# Patient Record
Sex: FEMALE | Race: BLACK OR AFRICAN AMERICAN | NOT HISPANIC OR LATINO | Employment: OTHER | URBAN - METROPOLITAN AREA
[De-identification: names, ages, dates, MRNs, and addresses within clinical notes are randomized per-mention and may not be internally consistent; named-entity substitution may affect disease eponyms.]

---

## 2017-11-08 ENCOUNTER — OFFICE VISIT (OUTPATIENT)
Dept: PRIMARY CARE CLINIC | Facility: CLINIC | Age: 37
End: 2017-11-08
Payer: MEDICAID

## 2017-11-08 ENCOUNTER — CLINICAL SUPPORT (OUTPATIENT)
Dept: PRIMARY CARE CLINIC | Facility: CLINIC | Age: 37
End: 2017-11-08
Payer: MEDICAID

## 2017-11-08 VITALS
RESPIRATION RATE: 20 BRPM | BODY MASS INDEX: 23.46 KG/M2 | WEIGHT: 140.81 LBS | DIASTOLIC BLOOD PRESSURE: 90 MMHG | HEART RATE: 96 BPM | OXYGEN SATURATION: 96 % | SYSTOLIC BLOOD PRESSURE: 131 MMHG | TEMPERATURE: 98 F | HEIGHT: 65 IN

## 2017-11-08 DIAGNOSIS — F45.8 TEETH GRINDING: ICD-10-CM

## 2017-11-08 DIAGNOSIS — E87.6 HYPOKALEMIA: ICD-10-CM

## 2017-11-08 DIAGNOSIS — F79 MENTAL RETARDATION: Primary | ICD-10-CM

## 2017-11-08 DIAGNOSIS — F20.9 SCHIZOPHRENIA, UNSPECIFIED TYPE: ICD-10-CM

## 2017-11-08 LAB
ALBUMIN SERPL BCP-MCNC: 3.7 G/DL
ALP SERPL-CCNC: 121 U/L
ALT SERPL W/O P-5'-P-CCNC: 18 U/L
ANION GAP SERPL CALC-SCNC: 9 MMOL/L
AST SERPL-CCNC: 23 U/L
BASOPHILS # BLD AUTO: 0.04 K/UL
BASOPHILS NFR BLD: 0.8 %
BILIRUB SERPL-MCNC: 0.2 MG/DL
BUN SERPL-MCNC: 14 MG/DL
CALCIUM SERPL-MCNC: 9.3 MG/DL
CARBAMAZEPINE SERPL-MCNC: 8.8 UG/ML
CHLORIDE SERPL-SCNC: 102 MMOL/L
CO2 SERPL-SCNC: 26 MMOL/L
CREAT SERPL-MCNC: 0.9 MG/DL
DIFFERENTIAL METHOD: NORMAL
EOSINOPHIL # BLD AUTO: 0 K/UL
EOSINOPHIL NFR BLD: 0 %
ERYTHROCYTE [DISTWIDTH] IN BLOOD BY AUTOMATED COUNT: 13.3 %
EST. GFR  (AFRICAN AMERICAN): >60 ML/MIN/1.73 M^2
EST. GFR  (NON AFRICAN AMERICAN): >60 ML/MIN/1.73 M^2
GLUCOSE SERPL-MCNC: 80 MG/DL
HCT VFR BLD AUTO: 38.3 %
HGB BLD-MCNC: 12.4 G/DL
IMM GRANULOCYTES # BLD AUTO: 0.02 K/UL
IMM GRANULOCYTES NFR BLD AUTO: 0.4 %
LYMPHOCYTES # BLD AUTO: 1.7 K/UL
LYMPHOCYTES NFR BLD: 33.6 %
MAGNESIUM SERPL-MCNC: 2 MG/DL
MCH RBC QN AUTO: 29.5 PG
MCHC RBC AUTO-ENTMCNC: 32.4 G/DL
MCV RBC AUTO: 91 FL
MONOCYTES # BLD AUTO: 0.6 K/UL
MONOCYTES NFR BLD: 12.8 %
NEUTROPHILS # BLD AUTO: 2.6 K/UL
NEUTROPHILS NFR BLD: 52.4 %
NRBC BLD-RTO: 0 /100 WBC
PLATELET # BLD AUTO: 317 K/UL
PMV BLD AUTO: 10.4 FL
POTASSIUM SERPL-SCNC: 3.7 MMOL/L
PROT SERPL-MCNC: 8.7 G/DL
RBC # BLD AUTO: 4.2 M/UL
SODIUM SERPL-SCNC: 137 MMOL/L
WBC # BLD AUTO: 4.91 K/UL

## 2017-11-08 PROCEDURE — 80156 ASSAY CARBAMAZEPINE TOTAL: CPT

## 2017-11-08 PROCEDURE — 99999 PR PBB SHADOW E&M-EST. PATIENT-LVL II: CPT | Mod: PBBFAC,,,

## 2017-11-08 PROCEDURE — 99212 OFFICE O/P EST SF 10 MIN: CPT | Mod: PBBFAC,27,PN

## 2017-11-08 PROCEDURE — 83735 ASSAY OF MAGNESIUM: CPT

## 2017-11-08 PROCEDURE — 85025 COMPLETE CBC W/AUTO DIFF WBC: CPT

## 2017-11-08 PROCEDURE — 99213 OFFICE O/P EST LOW 20 MIN: CPT | Mod: S$PBB,,, | Performed by: FAMILY MEDICINE

## 2017-11-08 PROCEDURE — 80053 COMPREHEN METABOLIC PANEL: CPT

## 2017-11-08 PROCEDURE — 99213 OFFICE O/P EST LOW 20 MIN: CPT | Mod: PBBFAC,PN | Performed by: FAMILY MEDICINE

## 2017-11-08 PROCEDURE — 99999 PR PBB SHADOW E&M-EST. PATIENT-LVL III: CPT | Mod: PBBFAC,,, | Performed by: FAMILY MEDICINE

## 2017-11-08 RX ORDER — CLONIDINE HYDROCHLORIDE 0.2 MG/1
1 TABLET ORAL 2 TIMES DAILY
Refills: 3 | COMMUNITY
Start: 2017-10-28

## 2017-11-08 RX ORDER — QUETIAPINE FUMARATE 200 MG/1
1 TABLET, FILM COATED ORAL
Refills: 3 | COMMUNITY
Start: 2017-10-31

## 2017-11-08 RX ORDER — HYDROXYZINE PAMOATE 50 MG/1
1 CAPSULE ORAL
Refills: 3 | COMMUNITY
Start: 2017-10-31

## 2017-11-08 RX ORDER — CARBAMAZEPINE 200 MG/1
2 TABLET ORAL 2 TIMES DAILY
Refills: 3 | COMMUNITY
Start: 2017-10-21

## 2017-11-08 NOTE — PROGRESS NOTES
Subjective:       Patient ID: Arabella Rick is a 37 y.o. female.    Chief Complaint: Annual Exam (90L paperwork)    HPI: 37-year-old black female in for 90 L    ROS:  Skin: no psoriasis, eczema, skin cancer  HEENT: No headache, ocular pain, blurred vision, diplopia, epistaxis, hoarseness change in voice, thyroid trouble  Lung: No pneumonia, asthma, Tb, wheezing, SOB, no smoking  Heart: No chest pain, ankle edema, palpitations, MI, christopher murmur, hypertension, hyperlipidemia  Abdomen: No nausea, vomiting, diarrhea, constipation, ulcers, hepatitis, gallbladder disease, melena, hematochezia, hematemesis  : no UTI, renal disease, stones  GYN last menstrual period 3 weeks ago  MS: no fractures, O/A, lupus, rheumatoid, gout  Neuro: No dizziness, LOC, seizures + MR ,  No diabetes, no anemia, no anxiety, no depression history schizophrenia    Objective:   Physical Exam:  General: Well nourished, well developed, no acute distress some bizarre movements  Skin: No lesions  HEENT: Eyes PERRLA, EOM intact, nose patent, throat non-erythematous , slightly grinding teeth   NECK: Supple, no bruits, No JVD, no nodes  Lungs: Clear, no rales, rhonchi, wheezing  Heart: Regular rate and rhythm, no murmurs, gallops, or rubs  Abdomen: flat, bowel sounds positive, no tenderness, or organomegaly  MS: Range of motion and muscle strength intact  Neuro: Alert, CN intact, oriented X 3 decreased mentation  Extremities: No cyanosis, clubbing, or edema         Assessment:       1. Mental retardation    2. Schizophrenia, unspecified type    3. Hypokalemia    4. Teeth grinding        Plan:       Mental retardation    Schizophrenia, unspecified type  -     CBC auto differential; Future; Expected date: 11/08/2017  -     Comprehensive metabolic panel; Future; Expected date: 11/08/2017  -     MAGNESIUM; Future; Expected date: 11/22/2017  -     Carbamazepine level, total; Future; Expected date: 11/22/2017    Hypokalemia    Teeth grinding

## 2017-11-08 NOTE — PATIENT INSTRUCTIONS
History of ER visit with hypokalemia--do CBC CMP Tegretol level and magnesium  90L filled out  Refill medications

## 2017-11-17 ENCOUNTER — TELEPHONE (OUTPATIENT)
Dept: PRIMARY CARE CLINIC | Facility: CLINIC | Age: 37
End: 2017-11-17

## 2017-11-17 NOTE — TELEPHONE ENCOUNTER
----- Message from Zach Welsh MD sent at 11/11/2017  5:33 PM CST -----  Call tell lab WNL no new tx

## 2019-02-01 ENCOUNTER — OFFICE VISIT (OUTPATIENT)
Dept: PRIMARY CARE CLINIC | Facility: CLINIC | Age: 39
End: 2019-02-01
Payer: MEDICAID

## 2019-02-01 VITALS
OXYGEN SATURATION: 100 % | DIASTOLIC BLOOD PRESSURE: 88 MMHG | WEIGHT: 147 LBS | SYSTOLIC BLOOD PRESSURE: 124 MMHG | HEIGHT: 65 IN | BODY MASS INDEX: 24.49 KG/M2 | RESPIRATION RATE: 18 BRPM | HEART RATE: 101 BPM

## 2019-02-01 DIAGNOSIS — F45.8 TEETH GRINDING: ICD-10-CM

## 2019-02-01 DIAGNOSIS — F79 MENTAL RETARDATION: ICD-10-CM

## 2019-02-01 DIAGNOSIS — F20.9 SCHIZOPHRENIA, UNSPECIFIED TYPE: Primary | ICD-10-CM

## 2019-02-01 DIAGNOSIS — E87.6 HYPOKALEMIA: ICD-10-CM

## 2019-02-01 PROCEDURE — 99214 OFFICE O/P EST MOD 30 MIN: CPT | Mod: S$PBB,,, | Performed by: FAMILY MEDICINE

## 2019-02-01 PROCEDURE — 99213 OFFICE O/P EST LOW 20 MIN: CPT | Mod: PBBFAC,PN | Performed by: FAMILY MEDICINE

## 2019-02-01 PROCEDURE — 99999 PR PBB SHADOW E&M-EST. PATIENT-LVL III: ICD-10-PCS | Mod: PBBFAC,,, | Performed by: FAMILY MEDICINE

## 2019-02-01 PROCEDURE — 99214 PR OFFICE/OUTPT VISIT, EST, LEVL IV, 30-39 MIN: ICD-10-PCS | Mod: S$PBB,,, | Performed by: FAMILY MEDICINE

## 2019-02-01 PROCEDURE — 99999 PR PBB SHADOW E&M-EST. PATIENT-LVL III: CPT | Mod: PBBFAC,,, | Performed by: FAMILY MEDICINE

## 2019-02-01 NOTE — PROGRESS NOTES
Subjective:       Patient ID: Arabella Rick is a 38 y.o. female.    Chief Complaint: Other (90L form )    HPI: 37-year-old black female--eating well, +BM, ambulating well    ROS:  Skin: no psoriasis, eczema, skin cancer--neck lesions have resolved  HEENT: No headache, ocular pain, blurred vision, diplopia, epistaxis, hoarseness change in voice, thyroid trouble +teeth grinding  Lung: No pneumonia, asthma, Tb, wheezing, SOB, no smoking  Heart: No chest pain, ankle edema, palpitations, MI, christopher murmur, hypertension, hyperlipidemia  Abdomen: No nausea, vomiting, diarrhea, constipation, ulcers, hepatitis, gallbladder disease, melena, hematochezia, hematemesis  : no UTI, renal disease, stones  GYN last menstrual period 01/20/2019  MS: no fractures, O/A, lupus, rheumatoid, gout  Neuro: No dizziness, LOC, seizures + MR , +schizophrenia  No diabetes, no anemia, no anxiety, no depression history schizophrenia    Objective:   Physical Exam:  General: Well nourished, well developed, no acute distress some bizarre movements  Skin: No lesions  HEENT: Eyes PERRLA, EOM intact, nose patent, throat non-erythematous , slightly grinding teeth   NECK: Supple, no bruits, No JVD, no nodes  Lungs: Clear, no rales, rhonchi, wheezing  Heart: Regular rate and rhythm, no murmurs, gallops, or rubs  Abdomen: flat, bowel sounds positive, no tenderness, or organomegaly  MS: Range of motion and muscle strength intact  Neuro: Alert, CN intact, oriented X 3 decreased mentation  Extremities: No cyanosis, clubbing, or edema         Assessment:       1. Schizophrenia, unspecified type    2. Mental retardation    3. Teeth grinding    4. Hypokalemia        Plan:       Schizophrenia, unspecified type  -     CBC auto differential; Future; Expected date: 02/01/2019  -     Comprehensive metabolic panel; Future; Expected date: 02/01/2019  -     EKG 12-lead; Future  -     Fecal Immunochemical Test (iFOBT); Future; Expected date: 02/01/2019  -     Lipid  panel; Future; Expected date: 02/01/2019  -     X-Ray Chest PA And Lateral; Future; Expected date: 02/01/2019  -     POCT urine dipstick without microscope  -     T4, free; Future; Expected date: 02/01/2019  -     TSH; Future; Expected date: 02/01/2019    Mental retardation    Teeth grinding    Hypokalemia  -     T4, free; Future; Expected date: 02/01/2019  -     TSH; Future; Expected date: 02/01/2019

## 2019-10-17 DIAGNOSIS — Z12.39 BREAST CANCER SCREENING: ICD-10-CM

## 2019-12-04 ENCOUNTER — OFFICE VISIT (OUTPATIENT)
Dept: PRIMARY CARE CLINIC | Facility: CLINIC | Age: 39
End: 2019-12-04
Payer: MEDICAID

## 2019-12-04 VITALS
HEART RATE: 76 BPM | SYSTOLIC BLOOD PRESSURE: 122 MMHG | WEIGHT: 147 LBS | DIASTOLIC BLOOD PRESSURE: 84 MMHG | OXYGEN SATURATION: 97 % | BODY MASS INDEX: 24.49 KG/M2 | RESPIRATION RATE: 18 BRPM | HEIGHT: 65 IN

## 2019-12-04 DIAGNOSIS — F20.9 SCHIZOPHRENIA, UNSPECIFIED TYPE: ICD-10-CM

## 2019-12-04 DIAGNOSIS — F79 INTELLECTUAL DISABILITY: Primary | ICD-10-CM

## 2019-12-04 DIAGNOSIS — R56.9 SEIZURE: ICD-10-CM

## 2019-12-04 DIAGNOSIS — F45.8 TEETH GRINDING: ICD-10-CM

## 2019-12-04 DIAGNOSIS — E87.6 HYPOKALEMIA: ICD-10-CM

## 2019-12-04 PROCEDURE — 99999 PR PBB SHADOW E&M-EST. PATIENT-LVL III: CPT | Mod: PBBFAC,,, | Performed by: FAMILY MEDICINE

## 2019-12-04 PROCEDURE — 99214 PR OFFICE/OUTPT VISIT, EST, LEVL IV, 30-39 MIN: ICD-10-PCS | Mod: S$PBB,,, | Performed by: FAMILY MEDICINE

## 2019-12-04 PROCEDURE — 99214 OFFICE O/P EST MOD 30 MIN: CPT | Mod: S$PBB,,, | Performed by: FAMILY MEDICINE

## 2019-12-04 PROCEDURE — 99213 OFFICE O/P EST LOW 20 MIN: CPT | Mod: PBBFAC,PN | Performed by: FAMILY MEDICINE

## 2019-12-04 PROCEDURE — 99999 PR PBB SHADOW E&M-EST. PATIENT-LVL III: ICD-10-PCS | Mod: PBBFAC,,, | Performed by: FAMILY MEDICINE

## 2019-12-04 RX ORDER — TRAZODONE HYDROCHLORIDE 50 MG/1
50 TABLET ORAL NIGHTLY
Qty: 30 TABLET | Refills: 11 | Status: SHIPPED | OUTPATIENT
Start: 2019-12-04 | End: 2022-08-15

## 2019-12-04 NOTE — PROGRESS NOTES
Subjective:       Patient ID: Arabella Rick is a 39 y.o. female.    Chief Complaint: 90 L form    HPI: 37-year-old black female--patient lives with her mom--has 24 hr sitters.  In for 90 L form to be filled out--patient with mental retardation/schizophrenia/teeth grinding/hypokalemia/seizures.  Nonverbal    ROS:  Skin: no psoriasis, eczema, skin cancer--neck lesions --better--picks  arms and head  HEENT: No headache, ocular pain, blurred vision, diplopia, epistaxis, hoarseness change in voice, thyroid trouble +teeth grinding  Lung: No pneumonia, asthma, Tb, wheezing, SOB, no smoking  Heart: No chest pain, ankle edema, palpitations, MI, christopher murmur, hypertension, hyperlipidemia  Abdomen: No nausea, vomiting, diarrhea, constipation, ulcers, hepatitis, gallbladder disease, melena, hematochezia, hematemesis  : no UTI, renal disease, stones  GYN last menstrual period 2 weeks ago  MS: no fractures, O/A, lupus, rheumatoid, gout  Neuro: No dizziness, LOC,+ seizures--none times years + MR , +schizophrenia  No diabetes, no anemia, no anxiety, no depression history schizophrenia   Single, no children, lives with mother and sitters around clock, Disabled     Objective:   Physical Exam:  General: Well nourished, well developed, no acute distress some bizarre movements  Skin: No lesions  HEENT: Eyes PERRLA, EOM intact, nose patent, throat non-erythematous , slightly grinding teeth   NECK: Supple, no bruits, No JVD, no nodes  Lungs: Clear, no rales, rhonchi, wheezing  Heart: Regular rate and rhythm, no murmurs, gallops, or rubs  Abdomen: flat, bowel sounds positive, no tenderness, or organomegaly  MS: Range of motion and muscle strength intact  Neuro: Alert, CN intact, oriented X 3 decreased mentation  Extremities: No cyanosis, clubbing, or edema         Assessment:       1. Mental retardation    2. Schizophrenia, unspecified type    3. Teeth grinding    4. Hypokalemia    5. Seizure        Plan:       Mental  retardation    Schizophrenia, unspecified type  -     CBC auto differential; Future; Expected date: 12/04/2019  -     Comprehensive metabolic panel; Future; Expected date: 12/04/2019  -     Fecal Immunochemical Test (iFOBT); Future; Expected date: 12/04/2019  -     Lipid panel; Future; Expected date: 12/04/2019  -     T4, free; Future; Expected date: 12/04/2019  -     TSH; Future; Expected date: 12/04/2019  -     Fecal Immunochemical Test (iFOBT); Future; Expected date: 12/04/2019    Teeth grinding    Hypokalemia    Seizure  -     Carbamazepine level, total; Future; Expected date: 12/04/2019    Other orders  -     traZODone (DESYREL) 50 MG tablet; Take 1 tablet (50 mg total) by mouth every evening.  Dispense: 30 tablet; Refill: 11      90 L form filled out mental retardation/schizophrenia/teeth grinding/hypokalemia/seizures/nonverbal --see scanned in report  Patient with chronic grinding of teeth  Patient occasionally combative and hostile usually cooperative list in some some sleep issues  Patient needs routine lab CBCs CMP lipids T4 TSH and tegretol level-now-in 6 months needs CBCs CMP tegretol  Health maintenance lipids stool for blood tetanus flu shot mammogram Pap smear--doubt patient will be cooperative enough to do mammogram or Pap smear  Patient does take a nap with morning medications told could try and decrease the duration of the nap --patient could try does rule 50 mg q.h.s. p.r.n. to help with sleep

## 2020-10-14 ENCOUNTER — TELEPHONE (OUTPATIENT)
Dept: PRIMARY CARE CLINIC | Facility: CLINIC | Age: 40
End: 2020-10-14

## 2020-10-14 NOTE — TELEPHONE ENCOUNTER
----- Message from Jackelyn Gould sent at 10/14/2020  3:55 PM CDT -----  Contact: Pt [phone]  Requesting a call about a lab order she needs.    Please call and advise.    Thank You

## 2020-10-14 NOTE — TELEPHONE ENCOUNTER
----- Message from Jackelyn Gould sent at 10/14/2020  3:55 PM CDT -----  Contact: Pt [phone]  Requesting a call about a lab order she needs.    Please call and advise.    Thank You

## 2020-10-21 ENCOUNTER — TELEPHONE (OUTPATIENT)
Dept: PRIMARY CARE CLINIC | Facility: CLINIC | Age: 40
End: 2020-10-21

## 2020-10-21 NOTE — TELEPHONE ENCOUNTER
----- Message from Gwen Paris sent at 10/21/2020  3:40 PM CDT -----  Contact: Laney/Caregiver [phone]  Appt for tomorrow was cancelled by mistake. Please call to reschedule. Also needs to know if fasting Is necessary.    Please call and advise.    Thank You

## 2020-11-13 ENCOUNTER — OFFICE VISIT (OUTPATIENT)
Dept: PRIMARY CARE CLINIC | Facility: CLINIC | Age: 40
End: 2020-11-13
Payer: MEDICAID

## 2020-11-13 VITALS
HEART RATE: 106 BPM | OXYGEN SATURATION: 99 % | BODY MASS INDEX: 24.28 KG/M2 | DIASTOLIC BLOOD PRESSURE: 82 MMHG | SYSTOLIC BLOOD PRESSURE: 138 MMHG | RESPIRATION RATE: 18 BRPM | TEMPERATURE: 98 F | HEIGHT: 65 IN | WEIGHT: 145.75 LBS

## 2020-11-13 DIAGNOSIS — E87.6 HYPOKALEMIA: ICD-10-CM

## 2020-11-13 DIAGNOSIS — F20.9 SCHIZOPHRENIA, UNSPECIFIED TYPE: Primary | ICD-10-CM

## 2020-11-13 DIAGNOSIS — F45.8 TEETH GRINDING: ICD-10-CM

## 2020-11-13 DIAGNOSIS — F79 INTELLECTUAL DISABILITY: ICD-10-CM

## 2020-11-13 DIAGNOSIS — R56.9 SEIZURE: ICD-10-CM

## 2020-11-13 PROCEDURE — 99214 OFFICE O/P EST MOD 30 MIN: CPT | Mod: S$PBB,,, | Performed by: FAMILY MEDICINE

## 2020-11-13 PROCEDURE — 99214 PR OFFICE/OUTPT VISIT, EST, LEVL IV, 30-39 MIN: ICD-10-PCS | Mod: S$PBB,,, | Performed by: FAMILY MEDICINE

## 2020-11-13 PROCEDURE — 99213 OFFICE O/P EST LOW 20 MIN: CPT | Mod: PBBFAC,PN | Performed by: FAMILY MEDICINE

## 2020-11-13 PROCEDURE — 99999 PR PBB SHADOW E&M-EST. PATIENT-LVL III: CPT | Mod: PBBFAC,,, | Performed by: FAMILY MEDICINE

## 2020-11-13 PROCEDURE — 99999 PR PBB SHADOW E&M-EST. PATIENT-LVL III: ICD-10-PCS | Mod: PBBFAC,,, | Performed by: FAMILY MEDICINE

## 2020-11-13 NOTE — PROGRESS NOTES
Subjective:       Patient ID: Arabella Rick is a 40 y.o. female.    Chief Complaint: 90L form    HPI:  40-year-old female --in for 90 L to be filled out--eating well--+BM--ambulating well    ROS:  Skin: no psoriasis, eczema, skin cancer--neck lesions --better--picks  arms and head--doing much better   HEENT: No headache, ocular pain, blurred vision, diplopia, epistaxis, hoarseness change in voice, thyroid trouble +teeth grinding persists especially with anxiety  Lung: No pneumonia, asthma, Tb, wheezing, SOB, no smoking  Heart: No chest pain, ankle edema, palpitations, MI, christopher murmur, hypertension, hyperlipidemia  Abdomen: No nausea, vomiting, diarrhea, constipation, ulcers, hepatitis, gallbladder disease, melena, hematochezia, hematemesis  : no UTI, renal disease, stones  GYN last menstrual period 11/02/2020  MS: no fractures, O/A, lupus, rheumatoid, gout  Neuro: No dizziness, LOC,+ seizures--none times years + MR , +schizophrenia  No diabetes, no anemia, no anxiety, no depression history schizophrenia   Single, no children, lives with mother and sitters around clock, Disabled     Objective:   Physical Exam:  General: Well nourished, well developed, no acute distress some bizarre movements  Skin: No lesions  HEENT: Eyes PERRLA, EOM intact, nose patent, throat non-erythematous , slightly grinding teeth   NECK: Supple, no bruits, No JVD, no nodes  Lungs: Clear, no rales, rhonchi, wheezing  Heart: Regular rate and rhythm, no murmurs, gallops, or rubs  Abdomen: flat, bowel sounds positive, no tenderness, or organomegaly  MS: Range of motion and muscle strength intact  Neuro: Alert, CN intact, oriented X 3 decreased mentation  Extremities: No cyanosis, clubbing, or edema         Assessment:       1. Schizophrenia, unspecified type    2. Mental retardation    3. Seizure    4. Teeth grinding    5. Hypokalemia        Plan:       Schizophrenia, unspecified type    Mental retardation    Seizure  -     CBC Auto  Differential; Future; Expected date: 11/13/2020  -     Comprehensive Metabolic Panel; Future; Expected date: 11/13/2020  -     Lipid Panel; Future; Expected date: 11/13/2020  -     T4, Free; Future; Expected date: 11/13/2020  -     TSH; Future; Expected date: 11/13/2020  -     Carbamazepine Level, Total; Future; Expected date: 11/13/2020    Teeth grinding    Hypokalemia      90 L form filled out mental retardation/schizophrenia/teeth grinding/hypokalemia/seizures/nonverbal --see scanned in report  Patient with chronic grinding of teeth--habit persist  History of dried skin from picking has markedly improved on neck hands  Patient occasionally combative and hostile usually cooperative list in some some sleep issues  Patient needs routine lab CBCs CMP lipids T4 TSH and tegretol level-now-in 6 months needs CBCs CMP tegretol  Health maintenance lipids stool for blood tetanus flu shot mammogram Pap smear--doubt patient will be cooperative enough to do mammogram or Pap smear

## 2021-10-19 ENCOUNTER — OFFICE VISIT (OUTPATIENT)
Dept: PRIMARY CARE CLINIC | Facility: CLINIC | Age: 41
End: 2021-10-19
Payer: MEDICAID

## 2021-10-19 VITALS
DIASTOLIC BLOOD PRESSURE: 82 MMHG | SYSTOLIC BLOOD PRESSURE: 124 MMHG | HEART RATE: 82 BPM | RESPIRATION RATE: 18 BRPM | WEIGHT: 150 LBS | HEIGHT: 65 IN | BODY MASS INDEX: 24.99 KG/M2 | OXYGEN SATURATION: 99 %

## 2021-10-19 DIAGNOSIS — R56.9 SEIZURE: ICD-10-CM

## 2021-10-19 DIAGNOSIS — F45.8 TEETH GRINDING: ICD-10-CM

## 2021-10-19 DIAGNOSIS — F79 INTELLECTUAL DISABILITY: Primary | ICD-10-CM

## 2021-10-19 DIAGNOSIS — E87.6 HYPOKALEMIA: ICD-10-CM

## 2021-10-19 DIAGNOSIS — F20.9 SCHIZOPHRENIA, UNSPECIFIED TYPE: ICD-10-CM

## 2021-10-19 PROCEDURE — 99999 PR PBB SHADOW E&M-EST. PATIENT-LVL III: ICD-10-PCS | Mod: PBBFAC,,, | Performed by: FAMILY MEDICINE

## 2021-10-19 PROCEDURE — 99213 OFFICE O/P EST LOW 20 MIN: CPT | Mod: PBBFAC,PN | Performed by: FAMILY MEDICINE

## 2021-10-19 PROCEDURE — 99214 OFFICE O/P EST MOD 30 MIN: CPT | Mod: S$PBB,,, | Performed by: FAMILY MEDICINE

## 2021-10-19 PROCEDURE — 99999 PR PBB SHADOW E&M-EST. PATIENT-LVL III: CPT | Mod: PBBFAC,,, | Performed by: FAMILY MEDICINE

## 2021-10-19 PROCEDURE — 99214 PR OFFICE/OUTPT VISIT, EST, LEVL IV, 30-39 MIN: ICD-10-PCS | Mod: S$PBB,,, | Performed by: FAMILY MEDICINE

## 2022-03-14 DIAGNOSIS — Z12.31 OTHER SCREENING MAMMOGRAM: ICD-10-CM

## 2022-06-15 ENCOUNTER — TELEPHONE (OUTPATIENT)
Dept: PRIMARY CARE CLINIC | Facility: CLINIC | Age: 42
End: 2022-06-15
Payer: MEDICAID

## 2022-06-15 NOTE — TELEPHONE ENCOUNTER
----- Message from Carmelo Galloway sent at 6/15/2022 11:14 AM CDT -----  Contact: Private valerio/ Shayna [phone]  [email or web address]     Patient is calling for Medical Advice regarding: Cough, vomited last night, and tiredness/weak    How long has patient had these symptoms: 2 days/ she tested positive with at home covid test today    Pharmacy name and phone#: [id number] - [place], [place] - [address] AT [facility] [address]  Phone:  [phone]  Fax:  [phone]              
Returned patients call. Scheduled patient for an audio visit   
no Active ROM deficits were identified

## 2022-06-16 ENCOUNTER — OFFICE VISIT (OUTPATIENT)
Dept: PRIMARY CARE CLINIC | Facility: CLINIC | Age: 42
End: 2022-06-16
Payer: MEDICAID

## 2022-06-16 DIAGNOSIS — R56.9 SEIZURE: ICD-10-CM

## 2022-06-16 DIAGNOSIS — F79 INTELLECTUAL DISABILITY: ICD-10-CM

## 2022-06-16 DIAGNOSIS — F45.8 TEETH GRINDING: ICD-10-CM

## 2022-06-16 DIAGNOSIS — F20.9 SCHIZOPHRENIA, UNSPECIFIED TYPE: Primary | ICD-10-CM

## 2022-06-16 DIAGNOSIS — U07.1 COVID-19: ICD-10-CM

## 2022-06-16 PROCEDURE — 99213 OFFICE O/P EST LOW 20 MIN: CPT | Mod: 95,,, | Performed by: FAMILY MEDICINE

## 2022-06-16 PROCEDURE — 99213 PR OFFICE/OUTPT VISIT, EST, LEVL III, 20-29 MIN: ICD-10-PCS | Mod: 95,,, | Performed by: FAMILY MEDICINE

## 2022-06-16 RX ORDER — PREDNISONE 5 MG/1
TABLET ORAL
Qty: 20 TABLET | Refills: 0 | Status: SHIPPED | OUTPATIENT
Start: 2022-06-16 | End: 2022-08-15

## 2022-06-16 RX ORDER — AZITHROMYCIN 250 MG/1
TABLET, FILM COATED ORAL
Qty: 6 TABLET | Refills: 0 | Status: CANCELLED | OUTPATIENT
Start: 2022-06-16 | End: 2022-06-20

## 2022-06-16 RX ORDER — PREDNISONE 5 MG/1
TABLET ORAL
Qty: 20 TABLET | Refills: 0 | Status: CANCELLED | OUTPATIENT
Start: 2022-06-16

## 2022-06-16 RX ORDER — AZITHROMYCIN 250 MG/1
TABLET, FILM COATED ORAL
Qty: 6 TABLET | Refills: 0 | Status: SHIPPED | OUTPATIENT
Start: 2022-06-16 | End: 2022-06-20

## 2022-06-16 NOTE — PROGRESS NOTES
Subjective:       Patient ID: Arabella Rick is a 41 y.o. female.    Chief Complaint: No chief complaint on file.    HPI:  41 -year-old female -history COVID positive--sick x4 days--had slight cough Monday and Monday night vomited--Tuesday no further vomiting but sleepy--had Wednesday COVID test was positive--this morning got up 8 but with this wants to sleep---no fever--+stuffy nose--+sore throat--+slight cough.  No pneumonia asthma TB no smoking.  History nausea vomiting no diarrhea.  Did not get vaccine COVID positive    ROS:  Skin: no psoriasis, eczema, skin cancer--neck lesions --better--picks  arms and head--doing much better   HEENT: No headache, ocular pain, blurred vision, diplopia, epistaxis, hoarseness change in voice, thyroid trouble +teeth grinding persists especially with anxiety  Lung: No pneumonia, asthma, Tb, wheezing, SOB, no smoking  Heart: No chest pain, ankle edema, palpitations, MI, christopher murmur, hypertension, hyperlipidemia  Abdomen: No nausea, vomiting, diarrhea, constipation, ulcers, hepatitis, gallbladder disease, melena, hematochezia, hematemesis  : no UTI, renal disease, stones  GYN last menstrual period 11/02/2020  MS: no fractures, O/A, lupus, rheumatoid, gout  Neuro: No dizziness, LOC,+ seizures--none times years + MR , +schizophrenia  No diabetes, no anemia, no anxiety, no depression history schizophrenia   Single, no children, lives with mother and sitters around clock, Disabled     Objective:   Physical Exam:  No physical exam was done this is a audio visit--the visit below is from a prior office visit  General: Well nourished, well developed, no acute distress some bizarre movements  Skin: No lesions  HEENT: Eyes PERRLA, EOM intact, nose patent, throat non-erythematous ,  grinding teeth   NECK: Supple, no bruits, No JVD, no nodes  Lungs: Clear, no rales, rhonchi, wheezing  Heart: Regular rate and rhythm, no murmurs, gallops, or rubs  Abdomen: flat, bowel sounds positive, no  tenderness, or organomegaly  MS: Range of motion and muscle strength intact  Neuro: Alert, CN intact, oriented X 3 decreased mentation  Extremities: No cyanosis, clubbing, or edema         Assessment:       1. Schizophrenia, unspecified type    2. COVID-19    3. Teeth grinding    4. Seizure    5. Mental retardation        Plan:       Schizophrenia, unspecified type    COVID-19    Teeth grinding    Seizure    Mental retardation    Other orders  -     azithromycin (Z-MARY) 250 MG tablet; 2 tabs by mouth day 1, then 1 tab by mouth daily x 4 days  Dispense: 6 tablet; Refill: 0  -     predniSONE (DELTASONE) 5 MG tablet; 4 po qd x 2, 3 po qd x2, 2 po qd x2, 1 po qd x2  Dispense: 20 tablet; Refill: 0        Main Reason for Visit  +COVID positive--treat with Zithromax/prednisone taper/over-the-counter cough medication--get pulse ox if drops to 92 go to emergency room for chest x-ray or CT scan of the chest at 88 has to be on oxygen--needs to isolate for 5 days then can wear a mask for the next 5 days then able to remove mass  Other medical illness  Patient with chronic grinding of teeth--habit persist  History of dried skin from picking has markedly improved on neck hands  Patient occasionally combative and hostile usually cooperative list in some some sleep issues  History of some neck discomfort appears to be normal now if recurs told to call for x-ray of the neck can give ibuprofen OTC or Tylenol  Patient needs routine lab CBCs CMP lipids T4 TSH and tegretol level-now-in 6 months needs CBCs CMP tegretol  Health maintenance see sheet     Established Patient - Audio Only Telehealth Visit      The patient location is:  Home  The chief complaint leading to consultation is:  covid  Visit type: Virtual visit with audio only (telephone)  Total time spent with patient:  30 minutes       The reason for the audio only service rather than synchronous audio and video virtual visit was related to technical difficulties or patient  preference/necessity.     Each patient to whom I provide medical services by telemedicine is:  (1) informed of the relationship between the physician and patient and the respective role of any other health care provider with respect to management of the patient; and (2) notified that they may decline to receive medical services by telemedicine and may withdraw from such care at any time. Patient verbally consented to receive this service via voice-only telephone call.       HPI:  See history of present illness above     Assessment and plan:  See plan above                        This service was not originating from a related E/M service provided within the previous 7 days nor will  to an E/M service or procedure within the next 24 hours or my soonest available appointment.  Prevailing standard of care was able to be met in this audio-only visit.

## 2022-06-17 RX ORDER — AZITHROMYCIN 250 MG/1
TABLET, FILM COATED ORAL
Qty: 6 TABLET | Refills: 0 | OUTPATIENT
Start: 2022-06-17

## 2022-06-17 NOTE — TELEPHONE ENCOUNTER
----- Message from Celia Young sent at 6/17/2022 11:07 AM CDT -----  Regarding: clearance  Contact: Morris Wilson  [phone]  Diagnostics codes are needed for Rx .  Waiting at pharmacy

## 2022-08-11 ENCOUNTER — PATIENT OUTREACH (OUTPATIENT)
Dept: ADMINISTRATIVE | Facility: HOSPITAL | Age: 42
End: 2022-08-11
Payer: MEDICAID

## 2022-08-11 NOTE — PROGRESS NOTES
Health Maintenance Due   Topic Date Due    Hepatitis C Screening  Never done    Cervical Cancer Screening  Never done    Lipid Panel  Never done    COVID-19 Vaccine (1) Never done    HIV Screening  Never done    TETANUS VACCINE  Never done    Mammogram  Never done

## 2022-08-15 ENCOUNTER — OFFICE VISIT (OUTPATIENT)
Dept: PRIMARY CARE CLINIC | Facility: CLINIC | Age: 42
End: 2022-08-15
Payer: MEDICAID

## 2022-08-15 VITALS
DIASTOLIC BLOOD PRESSURE: 64 MMHG | BODY MASS INDEX: 24.33 KG/M2 | OXYGEN SATURATION: 99 % | HEIGHT: 65 IN | RESPIRATION RATE: 18 BRPM | HEART RATE: 104 BPM | TEMPERATURE: 97 F | SYSTOLIC BLOOD PRESSURE: 122 MMHG | WEIGHT: 146.06 LBS

## 2022-08-15 DIAGNOSIS — R56.9 SEIZURE: ICD-10-CM

## 2022-08-15 DIAGNOSIS — F79 INTELLECTUAL DISABILITY: ICD-10-CM

## 2022-08-15 DIAGNOSIS — F20.0 PARANOID SCHIZOPHRENIA: ICD-10-CM

## 2022-08-15 DIAGNOSIS — Z01.419 ROUTINE GYNECOLOGICAL EXAMINATION: Primary | ICD-10-CM

## 2022-08-15 PROCEDURE — 3078F PR MOST RECENT DIASTOLIC BLOOD PRESSURE < 80 MM HG: ICD-10-PCS | Mod: CPTII,,, | Performed by: FAMILY MEDICINE

## 2022-08-15 PROCEDURE — 99999 PR PBB SHADOW E&M-EST. PATIENT-LVL III: CPT | Mod: PBBFAC,,, | Performed by: FAMILY MEDICINE

## 2022-08-15 PROCEDURE — 3078F DIAST BP <80 MM HG: CPT | Mod: CPTII,,, | Performed by: FAMILY MEDICINE

## 2022-08-15 PROCEDURE — 99213 OFFICE O/P EST LOW 20 MIN: CPT | Mod: PBBFAC,PN | Performed by: FAMILY MEDICINE

## 2022-08-15 PROCEDURE — 99999 PR PBB SHADOW E&M-EST. PATIENT-LVL III: ICD-10-PCS | Mod: PBBFAC,,, | Performed by: FAMILY MEDICINE

## 2022-08-15 PROCEDURE — 99214 OFFICE O/P EST MOD 30 MIN: CPT | Mod: S$PBB,,, | Performed by: FAMILY MEDICINE

## 2022-08-15 PROCEDURE — 3008F BODY MASS INDEX DOCD: CPT | Mod: CPTII,,, | Performed by: FAMILY MEDICINE

## 2022-08-15 PROCEDURE — 99214 PR OFFICE/OUTPT VISIT, EST, LEVL IV, 30-39 MIN: ICD-10-PCS | Mod: S$PBB,,, | Performed by: FAMILY MEDICINE

## 2022-08-15 PROCEDURE — 3008F PR BODY MASS INDEX (BMI) DOCUMENTED: ICD-10-PCS | Mod: CPTII,,, | Performed by: FAMILY MEDICINE

## 2022-08-15 PROCEDURE — 3074F SYST BP LT 130 MM HG: CPT | Mod: CPTII,,, | Performed by: FAMILY MEDICINE

## 2022-08-15 PROCEDURE — 3074F PR MOST RECENT SYSTOLIC BLOOD PRESSURE < 130 MM HG: ICD-10-PCS | Mod: CPTII,,, | Performed by: FAMILY MEDICINE

## 2022-08-15 NOTE — PROGRESS NOTES
Subjective:       Patient ID: Arabella Rick is a 41 y.o. female.    Chief Complaint: Establish Care (Pt needs letter for [employer] stating she requires round the clock care and a live-in assistant.)    HPI:  41 -year-old female -needs letter for [employer] but she requires round-the-clock care live in assistant.  Eating OK -- Ambulates well ---+ BM  Needs someone help prepare meals --assist with bathing--house cleaning   Pt occas screams   Pt with schizophrenia --seen [facility] .     ROS:  Skin: no psoriasis, eczema, skin cancer--neck lesions --better--picks  arms and head--doing much better   HEENT: No headache, ocular pain, blurred vision, diplopia, epistaxis, hoarseness change in voice, thyroid trouble +teeth grinding persists especially with anxiety  Lung: No pneumonia, asthma, Tb, wheezing, SOB, no smoking  Heart: No chest pain, ankle edema, palpitations, MI, christopher murmur, hypertension, hyperlipidemia  Abdomen: No nausea, vomiting, diarrhea, constipation, ulcers, hepatitis, gallbladder disease, melena, hematochezia, hematemesis  : no UTI, renal disease, stones  GYN last menstrual period 8-3-2022  MS: no fractures, O/A, lupus, rheumatoid, gout  Neuro: No dizziness, LOC,+ seizures--none times years + MR , +schizophrenia  No diabetes, no anemia, no anxiety, no depression history schizophrenia   Single, no children, lives with mother and sitters around clock, Disabled     Objective:   Physical Exam:    General: Well nourished, well developed, no acute distress some bizarre movements  Skin: No lesions  HEENT: Eyes PERRLA, EOM intact, nose patent, throat non-erythematous ,  grinding teeth   NECK: Supple, no bruits, No JVD, no nodes  Lungs: Clear, no rales, rhonchi, wheezing  Heart: Regular rate and rhythm, no murmurs, gallops, or rubs  Abdomen: flat, bowel sounds positive, no tenderness, or organomegaly  MS: Range of motion and muscle strength intact  Neuro: Alert, CN intact, oriented X 3  decreased mentation  Extremities: No cyanosis, clubbing, or edema         Assessment:       1. Routine gynecological examination    2. Seizure    3. Mental retardation    4. Paranoid schizophrenia        Plan:       Routine gynecological examination  -     Ambulatory referral/consult to Obstetrics / Gynecology; Future; Expected date: 08/22/2022    Seizure    Mental retardation    Paranoid schizophrenia        Main Reason for Visit  Seizure nose needs [employer] and needs to be with someone around the clock has a live-in assist  Patient with chronic grinding of teeth--habit persist  History of dried skin from picking has markedly improved on neck hands  Patient occasionally combative and hostile usually cooperative list in some some sleep issues  Patient needs routine lab CBCs CMP lipids T4 TSH and tegretol level-now-in 6 months had some lab with mental health needs do lab not done on list now last done 2017   Health maintenance see sheet

## 2022-12-20 ENCOUNTER — OFFICE VISIT (OUTPATIENT)
Dept: PRIMARY CARE CLINIC | Facility: CLINIC | Age: 42
End: 2022-12-20
Payer: MEDICAID

## 2022-12-20 VITALS
SYSTOLIC BLOOD PRESSURE: 110 MMHG | HEIGHT: 65 IN | RESPIRATION RATE: 16 BRPM | HEART RATE: 72 BPM | WEIGHT: 144.75 LBS | TEMPERATURE: 97 F | OXYGEN SATURATION: 100 % | BODY MASS INDEX: 24.12 KG/M2 | DIASTOLIC BLOOD PRESSURE: 78 MMHG

## 2022-12-20 DIAGNOSIS — Z23 FLU VACCINE NEED: Primary | ICD-10-CM

## 2022-12-20 DIAGNOSIS — E78.5 HYPERLIPIDEMIA, UNSPECIFIED HYPERLIPIDEMIA TYPE: ICD-10-CM

## 2022-12-20 DIAGNOSIS — F79 INTELLECTUAL DISABILITY: ICD-10-CM

## 2022-12-20 DIAGNOSIS — Z11.59 NEED FOR HEPATITIS C SCREENING TEST: ICD-10-CM

## 2022-12-20 DIAGNOSIS — F20.3 UNDIFFERENTIATED SCHIZOPHRENIA: ICD-10-CM

## 2022-12-20 DIAGNOSIS — F45.8 TEETH GRINDING: ICD-10-CM

## 2022-12-20 DIAGNOSIS — R56.9 SEIZURE: ICD-10-CM

## 2022-12-20 PROCEDURE — 3074F PR MOST RECENT SYSTOLIC BLOOD PRESSURE < 130 MM HG: ICD-10-PCS | Mod: CPTII,,, | Performed by: FAMILY MEDICINE

## 2022-12-20 PROCEDURE — 99214 OFFICE O/P EST MOD 30 MIN: CPT | Mod: S$PBB,,, | Performed by: FAMILY MEDICINE

## 2022-12-20 PROCEDURE — 3008F PR BODY MASS INDEX (BMI) DOCUMENTED: ICD-10-PCS | Mod: CPTII,,, | Performed by: FAMILY MEDICINE

## 2022-12-20 PROCEDURE — 3008F BODY MASS INDEX DOCD: CPT | Mod: CPTII,,, | Performed by: FAMILY MEDICINE

## 2022-12-20 PROCEDURE — 1159F MED LIST DOCD IN RCRD: CPT | Mod: CPTII,,, | Performed by: FAMILY MEDICINE

## 2022-12-20 PROCEDURE — 99999 PR PBB SHADOW E&M-EST. PATIENT-LVL IV: CPT | Mod: PBBFAC,,, | Performed by: FAMILY MEDICINE

## 2022-12-20 PROCEDURE — 99214 PR OFFICE/OUTPT VISIT, EST, LEVL IV, 30-39 MIN: ICD-10-PCS | Mod: S$PBB,,, | Performed by: FAMILY MEDICINE

## 2022-12-20 PROCEDURE — 1159F PR MEDICATION LIST DOCUMENTED IN MEDICAL RECORD: ICD-10-PCS | Mod: CPTII,,, | Performed by: FAMILY MEDICINE

## 2022-12-20 PROCEDURE — 3078F PR MOST RECENT DIASTOLIC BLOOD PRESSURE < 80 MM HG: ICD-10-PCS | Mod: CPTII,,, | Performed by: FAMILY MEDICINE

## 2022-12-20 PROCEDURE — 3074F SYST BP LT 130 MM HG: CPT | Mod: CPTII,,, | Performed by: FAMILY MEDICINE

## 2022-12-20 PROCEDURE — 99999 PR PBB SHADOW E&M-EST. PATIENT-LVL IV: ICD-10-PCS | Mod: PBBFAC,,, | Performed by: FAMILY MEDICINE

## 2022-12-20 PROCEDURE — 90686 IIV4 VACC NO PRSV 0.5 ML IM: CPT | Mod: PBBFAC,PN

## 2022-12-20 PROCEDURE — 99214 OFFICE O/P EST MOD 30 MIN: CPT | Mod: PBBFAC,PN | Performed by: FAMILY MEDICINE

## 2022-12-20 PROCEDURE — 3078F DIAST BP <80 MM HG: CPT | Mod: CPTII,,, | Performed by: FAMILY MEDICINE

## 2022-12-20 NOTE — PROGRESS NOTES
Subjective:       Patient ID: Arabella Rick is a 42 y.o. female.    Chief Complaint: Follow-up (90 L form filled out)    HPI:  47-y ear-old female - has full timme sitter   Eating OK -- Ambulates well ---+ BM  Needs someone help prepare meals --assist with bathing--house cleaning   Pt occas screams --grinds teeth   Pt with schizophrenia --seen [facility] .     ROS:  No significant change except for history of present illness  Skin: no psoriasis, eczema, skin cancer--neck lesions --better--picks  arms and head--doing much better   HEENT: No headache, ocular pain, blurred vision, diplopia, epistaxis, hoarseness change in voice, thyroid trouble +teeth grinding persists especially with anxiety  Lung: No pneumonia, asthma, Tb, wheezing, SOB, no smoking  Heart: No chest pain, ankle edema, palpitations, MI, christopher murmur, hypertension, hyperlipidemia  Abdomen: No nausea, vomiting, diarrhea, constipation, ulcers, hepatitis, gallbladder disease, melena, hematochezia, hematemesis  : no UTI, renal disease, stones  GYN last menstrual period 8-3-2022  MS: no fractures, O/A, lupus, rheumatoid, gout  Neuro: No dizziness, LOC,+ seizures--none times years + MR , +schizophrenia  No diabetes, no anemia, no anxiety, no depression history schizophrenia   Single, no children, lives with mother and sitters around clock, Disabled     Objective:   Physical Exam:    General: Well nourished, well developed, no acute distress some bizarre movements overweight   Skin: No lesions  HEENT: Eyes PERRLA, EOM intact, nose patent, throat non-erythematous ,  grinding teeth   NECK: Supple, no bruits, No JVD, no nodes  Lungs: Clear, no rales, rhonchi, wheezing  Heart: Regular rate and rhythm, no murmurs, gallops, or rubs  Abdomen: flat, bowel sounds positive, no tenderness, or organomegaly  MS: Range of motion and muscle strength intact--some weird movements arms and legs   Neuro: Alert, CN intact, oriented X 3 decreased  mentation  Extremities: No cyanosis, clubbing, or edema         Assessment:       1. Flu vaccine need    2. Hyperlipidemia, unspecified hyperlipidemia type    3. Need for hepatitis C screening test    4. Undifferentiated schizophrenia    5. Mental retardation    6. Seizure    7. Teeth grinding        Plan:       Flu vaccine need  -     Influenza - Quadrivalent *Preferred* (6 months+) (PF)    Hyperlipidemia, unspecified hyperlipidemia type  -     Lipid Panel; Future; Expected date: 12/20/2022  -     CBC Auto Differential; Future; Expected date: 12/20/2022  -     Comprehensive Metabolic Panel; Future; Expected date: 12/20/2022  -     Lipid Panel; Future; Expected date: 12/20/2022    Need for hepatitis C screening test  -     HEPATITIS C ANTIBODY; Future; Expected date: 12/20/2022    Undifferentiated schizophrenia    Mental retardation  -     TSH; Future; Expected date: 12/20/2022  -     T4, Free; Future; Expected date: 12/20/2022    Seizure  -     Carbamazepine Level, Total; Future; Expected date: 12/20/2022    Teeth grinding        Main Reason for   Needs 90 Lfilled out see form   Seizure nose needs Section 8 and needs to be with someone around the clock has a live-in assist  Patient with chronic grinding of teeth--habit persist  History of dried skin from picking has markedly improved on neck hands  Patient occasionally combative and hostile usually cooperative list in some some sleep issues  Patient needs routine lab CBCs CMP lipids T4 TSH and tegretol level-now-in 6 months had some lab with mental health needs do lab not done on list now last done 2017   Health maintenance see sheet

## 2023-01-13 LAB
ALBUMIN SERPL-MCNC: 4.2 G/DL (ref 3.6–5.1)
ALBUMIN/GLOB SERPL: 1.2 (CALC) (ref 1–2.5)
ALP SERPL-CCNC: 95 U/L (ref 31–125)
ALT SERPL-CCNC: 27 U/L (ref 6–29)
AST SERPL-CCNC: 26 U/L (ref 10–30)
BASOPHILS # BLD AUTO: 29 CELLS/UL (ref 0–200)
BASOPHILS NFR BLD AUTO: 0.8 %
BILIRUB SERPL-MCNC: 0.3 MG/DL (ref 0.2–1.2)
BUN SERPL-MCNC: 10 MG/DL (ref 7–25)
BUN/CREAT SERPL: NORMAL (CALC) (ref 6–22)
CALCIUM SERPL-MCNC: 8.9 MG/DL (ref 8.6–10.2)
CARBAMAZEPINE SERPL-MCNC: 9 MG/L (ref 4–12)
CHLORIDE SERPL-SCNC: 102 MMOL/L (ref 98–110)
CHOLEST SERPL-MCNC: 227 MG/DL
CHOLEST/HDLC SERPL: 2.4 (CALC)
CO2 SERPL-SCNC: 28 MMOL/L (ref 20–32)
CREAT SERPL-MCNC: 0.83 MG/DL (ref 0.5–0.99)
EGFR: 90 ML/MIN/1.73M2
EOSINOPHIL # BLD AUTO: 0 CELLS/UL (ref 15–500)
EOSINOPHIL NFR BLD AUTO: 0 %
ERYTHROCYTE [DISTWIDTH] IN BLOOD BY AUTOMATED COUNT: 13.4 % (ref 11–15)
GLOBULIN SER CALC-MCNC: 3.5 G/DL (CALC) (ref 1.9–3.7)
GLUCOSE SERPL-MCNC: 84 MG/DL (ref 65–99)
HCT VFR BLD AUTO: 35.8 % (ref 35–45)
HCV AB S/CO SERPL IA: 0.07
HCV AB SERPL QL IA: NORMAL
HDLC SERPL-MCNC: 94 MG/DL
HGB BLD-MCNC: 12.1 G/DL (ref 11.7–15.5)
LDLC SERPL CALC-MCNC: 118 MG/DL (CALC)
LYMPHOCYTES # BLD AUTO: 2099 CELLS/UL (ref 850–3900)
LYMPHOCYTES NFR BLD AUTO: 58.3 %
MCH RBC QN AUTO: 31.1 PG (ref 27–33)
MCHC RBC AUTO-ENTMCNC: 33.8 G/DL (ref 32–36)
MCV RBC AUTO: 92 FL (ref 80–100)
MONOCYTES # BLD AUTO: 414 CELLS/UL (ref 200–950)
MONOCYTES NFR BLD AUTO: 11.5 %
NEUTROPHILS # BLD AUTO: 1058 CELLS/UL (ref 1500–7800)
NEUTROPHILS NFR BLD AUTO: 29.4 %
NONHDLC SERPL-MCNC: 133 MG/DL (CALC)
PLATELET # BLD AUTO: 277 THOUSAND/UL (ref 140–400)
PMV BLD REES-ECKER: 10.2 FL (ref 7.5–12.5)
POTASSIUM SERPL-SCNC: 4.1 MMOL/L (ref 3.5–5.3)
PROT SERPL-MCNC: 7.7 G/DL (ref 6.1–8.1)
RBC # BLD AUTO: 3.89 MILLION/UL (ref 3.8–5.1)
SODIUM SERPL-SCNC: 136 MMOL/L (ref 135–146)
T4 FREE SERPL-MCNC: 0.6 NG/DL (ref 0.8–1.8)
TRIGL SERPL-MCNC: 56 MG/DL
TSH SERPL-ACNC: 1.73 MIU/L
WBC # BLD AUTO: 3.6 THOUSAND/UL (ref 3.8–10.8)

## 2023-01-14 RX ORDER — LEVOTHYROXINE SODIUM 25 UG/1
25 TABLET ORAL
Qty: 90 TABLET | Refills: 3 | Status: SHIPPED | OUTPATIENT
Start: 2023-01-14 | End: 2024-01-16

## 2023-01-29 DIAGNOSIS — E03.9 HYPOTHYROIDISM, UNSPECIFIED TYPE: Primary | ICD-10-CM

## 2023-12-11 ENCOUNTER — TELEPHONE (OUTPATIENT)
Dept: PRIMARY CARE CLINIC | Facility: CLINIC | Age: 43
End: 2023-12-11
Payer: MEDICAID

## 2023-12-11 NOTE — TELEPHONE ENCOUNTER
----- Message from Dylan Choudhury sent at 12/11/2023  2:55 PM CST -----  Contact: Laney caregiver   Caller is requesting an earlier appointment then we can schedule.  Caller is requesting a message be sent to the provider.  If this is for urgent care symptoms, did you offer other providers at this location, providers at other locations, or [facility]? (yes, no, n/a):  yes  If this is for the patients physical, did you offer to schedule next available and put on wait list, or to see NP or PA for their physical?  (yes, no, n/a):  yes  When is the next available appointment with their provider:  01/30/2024  Reason for the appointment:  90L form  Patient preference of timeframe to be scheduled:  this week   Would the patient like a call back, or a response through their [facility] portal?:   call  Comments:

## 2024-01-14 NOTE — TELEPHONE ENCOUNTER
Care Due:                  Date            Visit Type   Department     Provider  --------------------------------------------------------------------------------                                EP -                              PRIMARY      [facility]  Last Visit: 12-      CARE ([facility])   PRIMARY CARE   Zach Welsh  Next Visit: None Scheduled  None         None Found                                                            Last  Test          Frequency    Reason                     Performed    Due Date  --------------------------------------------------------------------------------    Office Visit  15 months..  levothyroxine............  12- 03-    TSH.........  12 months..  levothyroxine............  01- 01-    [facility] Embedded Care Due Messages. Reference number: [id number].   1/14/2024 4:10:07 AM CST

## 2024-01-16 RX ORDER — LEVOTHYROXINE SODIUM 25 UG/1
25 TABLET ORAL
Qty: 90 TABLET | Refills: 3 | Status: SHIPPED | OUTPATIENT
Start: 2024-01-16

## 2024-01-16 NOTE — TELEPHONE ENCOUNTER
Refill Routing Note   Medication(s) are not appropriate for processing by [facility] for the following reason(s):        Required labs outdated    [facility] action(s):  Defer   Requires appointment : Yes     Requires labs : Yes             Appointments  past 12m or future 3m with PCP    Date Provider   Last Visit   12/20/2022 Zach Welsh MD   Next Visit   Visit date not found Zach Welsh MD   ED visits in past 90 days: 0        Note composed:3:25 AM 01/16/2024

## 2024-01-17 ENCOUNTER — TELEPHONE (OUTPATIENT)
Dept: PRIMARY CARE CLINIC | Facility: CLINIC | Age: 44
End: 2024-01-17
Payer: MEDICAID

## 2024-01-17 NOTE — TELEPHONE ENCOUNTER
----- Message from Roslyn Casanova sent at 1/17/2024  9:14 AM CST -----  Contact: Laney (caregiver) [phone]  Est medicaid pt requesting a call for appt.    Please call and advise

## 2024-01-24 ENCOUNTER — TELEPHONE (OUTPATIENT)
Dept: PRIMARY CARE CLINIC | Facility: CLINIC | Age: 44
End: 2024-01-24
Payer: MEDICAID

## 2024-01-24 NOTE — TELEPHONE ENCOUNTER
Called pt caregiver in regards to message. Laney stated they could not come in today but she will call back tomorrow to see if we can fit them in to do the 90 L form

## 2024-01-24 NOTE — TELEPHONE ENCOUNTER
----- Message from Tamiko Rick sent at 1/24/2024  8:47 AM CST -----  Contact: Laney pt's caretaker [phone]  Laney is calling in regards to pt being seen before the end of the month to have her 90 L form filled out. Pt's appt currently is scheduled for 02/06. Please call to schedule a sooner appt.                Thank you

## 2024-01-30 ENCOUNTER — TELEPHONE (OUTPATIENT)
Dept: PRIMARY CARE CLINIC | Facility: CLINIC | Age: 44
End: 2024-01-30
Payer: MEDICAID

## 2024-01-30 ENCOUNTER — OFFICE VISIT (OUTPATIENT)
Dept: PRIMARY CARE CLINIC | Facility: CLINIC | Age: 44
End: 2024-01-30
Payer: MEDICAID

## 2024-01-30 VITALS
HEIGHT: 65 IN | WEIGHT: 148.94 LBS | RESPIRATION RATE: 18 BRPM | SYSTOLIC BLOOD PRESSURE: 112 MMHG | OXYGEN SATURATION: 95 % | BODY MASS INDEX: 24.82 KG/M2 | TEMPERATURE: 96 F | DIASTOLIC BLOOD PRESSURE: 80 MMHG | HEART RATE: 81 BPM

## 2024-01-30 DIAGNOSIS — F20.9 SCHIZOPHRENIA, UNSPECIFIED TYPE: ICD-10-CM

## 2024-01-30 DIAGNOSIS — F45.8 TEETH GRINDING: ICD-10-CM

## 2024-01-30 DIAGNOSIS — F79 INTELLECTUAL DISABILITY: Primary | ICD-10-CM

## 2024-01-30 DIAGNOSIS — R56.9 SEIZURE: ICD-10-CM

## 2024-01-30 PROCEDURE — 3079F DIAST BP 80-89 MM HG: CPT | Mod: CPTII,,, | Performed by: FAMILY MEDICINE

## 2024-01-30 PROCEDURE — 99213 OFFICE O/P EST LOW 20 MIN: CPT | Mod: PBBFAC,PN | Performed by: FAMILY MEDICINE

## 2024-01-30 PROCEDURE — 1159F MED LIST DOCD IN RCRD: CPT | Mod: CPTII,,, | Performed by: FAMILY MEDICINE

## 2024-01-30 PROCEDURE — 3008F BODY MASS INDEX DOCD: CPT | Mod: CPTII,,, | Performed by: FAMILY MEDICINE

## 2024-01-30 PROCEDURE — 3074F SYST BP LT 130 MM HG: CPT | Mod: CPTII,,, | Performed by: FAMILY MEDICINE

## 2024-01-30 PROCEDURE — 99214 OFFICE O/P EST MOD 30 MIN: CPT | Mod: S$PBB,,, | Performed by: FAMILY MEDICINE

## 2024-01-30 PROCEDURE — 99999 PR PBB SHADOW E&M-EST. PATIENT-LVL III: CPT | Mod: PBBFAC,,, | Performed by: FAMILY MEDICINE

## 2024-01-30 NOTE — TELEPHONE ENCOUNTER
----- Message from Leann Rosales sent at 1/30/2024  8:35 AM CST -----  Contact: [phone]  Patient is returning a phone call.  Who left a message for the patient: Dr Duenas's office  Does patient know what this is regarding:  yes  Would you like a call back, or a response through your [facility] portal?:   phone  Comments:

## 2024-01-30 NOTE — TELEPHONE ENCOUNTER
Called patient regarding message. Patient was schedule for an appointment today at 3:20pm. Patient caregiver verbalized understand.

## 2024-01-30 NOTE — TELEPHONE ENCOUNTER
Notified pt. That per dr. Duenas pt. Will need to schedule with her pcp . Patient understood that for Dr. Duenas to complete a 90L pt. Must establish care with him and be seen in a new patient appt. Slot.

## 2024-01-30 NOTE — TELEPHONE ENCOUNTER
----- Message from Leann Rosales sent at 1/30/2024  8:32 AM CST -----  Contact: [phone]  Caller is requesting an earlier appointment then we can schedule.  Caller is requesting a message be sent to the provider.  If this is for urgent care symptoms, did you offer other providers at this location, providers at other locations, or [facility]? (yes, no, n/a):  n/a  If this is for the patients physical, did you offer to schedule next available and put on wait list, or to see NP or PA for their physical?  (yes, no, n/a):  n/a  When is the next available appointment with their provider:  03/06/24  Reason for the appointment:  90 L  Patient preference of timeframe to be scheduled:  The week of 01/29/24  Would the patient like a call back, or a response through their [facility] portal?:   phone  Comments:  patient was scheduled to be seen by Dr Duenas, but they call her and told her that she needs to be seen by PCP. Please advise. Thank you

## 2024-01-30 NOTE — PROGRESS NOTES
Subjective:       Patient ID: Arabella Rick is a 43 y.o. female.    Chief Complaint: 90L forms    HPI:44 yo BF in for 90 L to be filled out---still grinds teeth b00 eating well--ambulating well--+ BM   Patient with schizophrenia still goes to [facility] occasionally screams out  Lab was reviewed has a slight decreased WBCs otherwise normal include her seizure medicationgg                47-y ear-old female - has full timme sitter   Eating OK -- Ambulates well ---+ BM  Needs someone help prepare meals --assist with bathing--house cleaning   Pt occas screams --grinds teeth   Pt with schizophrenia --seen [facility] .     ROS:   Skin: no psoriasis, eczema, skin cancer--neck lesions -  HEENT: No headache, ocular pain, blurred vision, diplopia, epistaxis, hoarseness change in voice, thyroid trouble +teeth grinding persists especially with anxiety  Lung: No pneumonia, asthma, Tb, wheezing, SOB, no smoking  Heart: No chest pain, ankle edema, palpitations, MI, christopher murmur, hypertension, hyperlipidemia  Abdomen: No nausea, vomiting, diarrhea, constipation, ulcers, hepatitis, gallbladder disease, melena, hematochezia, hematemesis  : no UTI, renal disease, stones  GYN last menstrual period 8-3-2022  MS: no fractures, O/A, lupus, rheumatoid, gout  Neuro: No dizziness, LOC,+ seizures--none times years + MR , +schizophrenia  No diabetes, no anemia, no anxiety, no depression history schizophrenia   Single, no children, lives with mother and sitters around clock, Disabled     Objective:   Physical Exam:    General: Well nourished, well developed, no acute distress some bizarre movements overweight   Skin: No lesions  HEENT: Eyes PERRLA, EOM intact, nose patent, throat non-erythematous ,  grinding teeth   NECK: Supple, no bruits, No JVD, no nodes  Lungs: Clear, no rales, rhonchi, wheezing  Heart: Regular rate and rhythm, no murmurs, gallops, or rubs  Abdomen: flat, bowel sounds positive, no  tenderness, or organomegaly  MS: Range of motion and muscle strength intact--some weird movements arms and legs   Neuro: Alert, CN intact, oriented X 3 decreased mentation  Extremities: No cyanosis, clubbing, or edema         Assessment:       1. Mental retardation    2. Seizure    3. Schizophrenia, unspecified type    4. Teeth grinding          Plan:       Mental retardation    Seizure  -     CBC Auto Differential; Future; Expected date: 01/30/2024  -     Comprehensive Metabolic Panel; Future; Expected date: 01/30/2024  -     CARBAMAZEPINE LEVEL, TOTAL; Future; Expected date: 01/30/2024    Schizophrenia, unspecified type    Teeth grinding          Main Reason for   Needs 90 Lfilled out see form   Lab reviewed WBCs slight decrease otherwise normal  Seizure nose needs Section 8 and needs to be with someone around the clock has a live-in assist  Patient with chronic grinding of teeth--habit persist  History of dried skin from picking has markedly improved on neck hands--using creams   Patient occasionally combative and hostile usually cooperative list in some some sleep issues  Patient needs routine lab CBCs CMP tegretol level   Health maintenance see sheet

## 2024-03-14 DIAGNOSIS — Z12.31 OTHER SCREENING MAMMOGRAM: ICD-10-CM

## 2024-03-19 ENCOUNTER — PATIENT OUTREACH (OUTPATIENT)
Dept: ADMINISTRATIVE | Facility: HOSPITAL | Age: 44
End: 2024-03-19
Payer: MEDICAID

## 2024-03-19 NOTE — PROGRESS NOTES
Health Maintenance Due   Topic Date Due    Mammogram  Never done    Influenza Vaccine (1) 09/01/2023     Immunizations - reviewed and updated   Care Everywhere - triggered   Care Teams - updated   Outreach - Patient due for mammogram, letter mailed to patient in regards to scheduling

## 2024-12-03 ENCOUNTER — OFFICE VISIT (OUTPATIENT)
Dept: PRIMARY CARE CLINIC | Facility: CLINIC | Age: 44
End: 2024-12-03
Payer: MEDICAID

## 2024-12-03 VITALS
HEART RATE: 70 BPM | DIASTOLIC BLOOD PRESSURE: 70 MMHG | RESPIRATION RATE: 18 BRPM | BODY MASS INDEX: 22.87 KG/M2 | SYSTOLIC BLOOD PRESSURE: 118 MMHG | OXYGEN SATURATION: 98 % | WEIGHT: 137.25 LBS | HEIGHT: 65 IN

## 2024-12-03 DIAGNOSIS — F20.3 UNDIFFERENTIATED SCHIZOPHRENIA: ICD-10-CM

## 2024-12-03 DIAGNOSIS — R56.9 SEIZURE: ICD-10-CM

## 2024-12-03 DIAGNOSIS — F79 INTELLECTUAL DISABILITY: ICD-10-CM

## 2024-12-03 DIAGNOSIS — E03.9 HYPOTHYROIDISM, UNSPECIFIED TYPE: Primary | ICD-10-CM

## 2024-12-03 DIAGNOSIS — F45.8 TEETH GRINDING: ICD-10-CM

## 2024-12-03 PROCEDURE — 1159F MED LIST DOCD IN RCRD: CPT | Mod: CPTII,,, | Performed by: FAMILY MEDICINE

## 2024-12-03 PROCEDURE — 99214 OFFICE O/P EST MOD 30 MIN: CPT | Mod: S$PBB,,, | Performed by: FAMILY MEDICINE

## 2024-12-03 PROCEDURE — 3074F SYST BP LT 130 MM HG: CPT | Mod: CPTII,,, | Performed by: FAMILY MEDICINE

## 2024-12-03 PROCEDURE — 99999 PR PBB SHADOW E&M-EST. PATIENT-LVL III: CPT | Mod: PBBFAC,,, | Performed by: FAMILY MEDICINE

## 2024-12-03 PROCEDURE — 3008F BODY MASS INDEX DOCD: CPT | Mod: CPTII,,, | Performed by: FAMILY MEDICINE

## 2024-12-03 PROCEDURE — 99213 OFFICE O/P EST LOW 20 MIN: CPT | Mod: PBBFAC,PN | Performed by: FAMILY MEDICINE

## 2024-12-03 PROCEDURE — 3078F DIAST BP <80 MM HG: CPT | Mod: CPTII,,, | Performed by: FAMILY MEDICINE

## 2024-12-03 NOTE — PROGRESS NOTES
Subjective:       Patient ID: Arabella Rick is a 44 y.o. female.    Chief Complaint: 90 L    HPI:44 yo BF in for 90 L eating well---+BM--ambulating well  Hypothyroidism on Synthroid 25 mg  Schizophreniaand MR  on Seroquel 200 mg and Catapres  Seizures on Tegretol  Anxiety on Vistaril     ROS:   Skin: no psoriasis, eczema, skin cancer--neck lesions -  HEENT: No headache, ocular pain, blurred vision, diplopia, epistaxis, hoarseness change in voice, thyroid trouble +teeth grinding persists especially with anxiety  Lung: No pneumonia, asthma, Tb, wheezing, SOB, no smoking  Heart: No chest pain, ankle edema, palpitations, MI, christopher murmur, hypertension, hyperlipidemia  Abdomen: No nausea, vomiting, diarrhea, constipation, ulcers, hepatitis, gallbladder disease, melena, hematochezia, hematemesis  : no UTI, renal disease, stones  GYN last menstrual period 3 weeks ago   MS: no fractures, O/A, lupus, rheumatoid, gout  Neuro: No dizziness, LOC,+ seizures--none times years + MR , +schizophrenia  No diabetes, no anemia, no anxiety, no depression history schizophrenia   Single, no children, lives with mother and sitters around clock, Disabled     Objective:   Physical Exam:    General: Well nourished, well developed, no acute distress some bizarre movements overweight   Skin: No lesions  HEENT: Eyes PERRLA, EOM intact, nose patent, throat non-erythematous ,  grinding teeth   NECK: Supple, no bruits, No JVD, no nodes  Lungs: Clear, no rales, rhonchi, wheezing  Heart: Regular rate and rhythm, no murmurs, gallops, or rubs  Abdomen: flat, bowel sounds positive, no tenderness, or organomegaly  MS: Range of motion and muscle strength intact--some weird movements arms and legs   Neuro: Alert, CN intact, oriented X 3 decreased mentation  Extremities: No cyanosis, clubbing, or edema         Assessment:       1. Hypothyroidism, unspecified type    2. Mental retardation    3. Undifferentiated schizophrenia    4. Seizure    5. Teeth  grinding            Plan:       Hypothyroidism, unspecified type    Mental retardation    Undifferentiated schizophrenia    Seizure    Teeth grinding            Main Reason for   Needs 90 Lfilled out see form   Seizure nose needs Section 8 and needs to be with someone around the clock has a live-in assist  Patient with chronic grinding of teeth--habit persist  History of dried skin from picking has markedly improved on neck hands--using creams   Patient occasionally combative and hostile usually cooperative list in some some sleep issues  Hypothyroidism on Synthroid  Schizophrenia on Seroquel and Vistaril and Catapres  Mental retardation  Patient needs routine lab CBCs CMP tegretol level T4 TSH lipids  Return six-month--90 L form filled out this visit

## 2025-06-17 ENCOUNTER — OFFICE VISIT (OUTPATIENT)
Dept: PRIMARY CARE CLINIC | Facility: CLINIC | Age: 45
End: 2025-06-17
Payer: MEDICAID

## 2025-06-17 VITALS
RESPIRATION RATE: 18 BRPM | HEIGHT: 65 IN | OXYGEN SATURATION: 98 % | DIASTOLIC BLOOD PRESSURE: 78 MMHG | BODY MASS INDEX: 24.24 KG/M2 | SYSTOLIC BLOOD PRESSURE: 116 MMHG | WEIGHT: 145.5 LBS | HEART RATE: 70 BPM

## 2025-06-17 DIAGNOSIS — R56.9 SEIZURE: ICD-10-CM

## 2025-06-17 DIAGNOSIS — E03.9 HYPOTHYROIDISM, UNSPECIFIED TYPE: ICD-10-CM

## 2025-06-17 DIAGNOSIS — F79 INTELLECTUAL DISABILITY: ICD-10-CM

## 2025-06-17 DIAGNOSIS — F20.9 SCHIZOPHRENIA, UNSPECIFIED TYPE: Primary | ICD-10-CM

## 2025-06-17 DIAGNOSIS — F45.8 TEETH GRINDING: ICD-10-CM

## 2025-06-17 PROCEDURE — 3074F SYST BP LT 130 MM HG: CPT | Mod: CPTII,,, | Performed by: FAMILY MEDICINE

## 2025-06-17 PROCEDURE — 1159F MED LIST DOCD IN RCRD: CPT | Mod: CPTII,,, | Performed by: FAMILY MEDICINE

## 2025-06-17 PROCEDURE — 99999 PR PBB SHADOW E&M-EST. PATIENT-LVL III: CPT | Mod: PBBFAC,,, | Performed by: FAMILY MEDICINE

## 2025-06-17 PROCEDURE — 3078F DIAST BP <80 MM HG: CPT | Mod: CPTII,,, | Performed by: FAMILY MEDICINE

## 2025-06-17 PROCEDURE — 99213 OFFICE O/P EST LOW 20 MIN: CPT | Mod: PBBFAC,PN | Performed by: FAMILY MEDICINE

## 2025-06-17 PROCEDURE — 3008F BODY MASS INDEX DOCD: CPT | Mod: CPTII,,, | Performed by: FAMILY MEDICINE

## 2025-06-17 PROCEDURE — 99214 OFFICE O/P EST MOD 30 MIN: CPT | Mod: S$PBB,,, | Performed by: FAMILY MEDICINE

## 2025-06-17 RX ORDER — LEVOTHYROXINE SODIUM 25 UG/1
25 TABLET ORAL
Qty: 90 TABLET | Refills: 3 | Status: SHIPPED | OUTPATIENT
Start: 2025-06-17

## 2025-06-17 NOTE — PROGRESS NOTES
Subjective:       Patient ID: Arabella Rick is a 44 y.o. female.    Chief Complaint: 6 month check up  and 90 L     HPI:42 yo BF in for 90 L eating well---+BM--ambulating well--balance is off a little occasionally needs support  Hypothyroidism on Synthroid 25 mg  Schizophreniaand MR  on Seroquel 200 mg and Catapres  Seizures on Tegretol  Anxiety on Vistaril  Lab overdue --needs CBCs CMP lipids T4 TSH and Tegretol     ROS:   Skin: no psoriasis, eczema, skin cancer--  HEENT: No headache, ocular pain, blurred vision, diplopia, epistaxis, hoarseness change in voice, thyroid trouble +teeth grinding persists especially with anxiety  Lung: No pneumonia, asthma, Tb, wheezing, SOB, no smoking  Heart: No chest pain, ankle edema, palpitations, MI, christopher murmur, hypertension, hyperlipidemia  Abdomen: No nausea, vomiting, diarrhea, constipation, ulcers, hepatitis, gallbladder disease, melena, hematochezia, hematemesis  : no UTI, renal disease, stones  GYN last menstrual period 3 weeks ago   MS: no fractures, O/A, lupus, rheumatoid, gout  Neuro: No dizziness, LOC,+ seizures--none times years + MR , +schizophrenia  No diabetes, no anemia, no anxiety, no depression history schizophrenia   Single, no children, lives with mother and sitters around clock, Disabled     Objective:   Physical Exam:    General: Well nourished, well developed, no acute distress some bizarre movements overweight   Skin: No lesions Hair shaved --pt was constantly pulling it out   HEENT: Eyes PERRLA, EOM intact, nose patent, throat non-erythematous ,  grinding teeth   NECK: Supple, no bruits, No JVD, no nodes  Lungs: Clear, no rales, rhonchi, wheezing  Heart: Regular rate and rhythm, no murmurs, gallops, or rubs  Abdomen: flat, bowel sounds positive, no tenderness, or organomegaly  MS: Range of motion and muscle strength intact--some weird movements arms and legs   Neuro: Alert, CN intact, oriented X 3 decreased mentation  Extremities: No cyanosis,  clubbing, or edema         Assessment:       1. Schizophrenia, unspecified type    2. Seizure    3. Teeth grinding    4. Mental retardation    5. Hypothyroidism, unspecified type              Plan:       Schizophrenia, unspecified type    Seizure    Teeth grinding    Mental retardation    Hypothyroidism, unspecified type              Main Reason for   Needs 90 Lfilled out see form   Seizure nose needs Section 8 and needs to be with someone around the clock has a live-in assist no seizure x years see Neurology see if able get off Tegretol  Patient with chronic grinding of teeth--habit persist  History of dried skin from picking has markedly improved on neck hands--using creams   Patient occasionally combative and hostile usually cooperative list in some some sleep issues  Hypothyroidism on Synthroid  Schizophrenia on Seroquel and Vistaril and Catapres  Mental retardation  Patient needs routine lab CBCs CMP tegretol level T4 TSH lipids  Return six-month--90 L form filled out this visit

## 2025-06-28 ENCOUNTER — RESULTS FOLLOW-UP (OUTPATIENT)
Dept: PRIMARY CARE CLINIC | Facility: CLINIC | Age: 45
End: 2025-06-28
Payer: MEDICAID